# Patient Record
Sex: FEMALE | Race: WHITE
[De-identification: names, ages, dates, MRNs, and addresses within clinical notes are randomized per-mention and may not be internally consistent; named-entity substitution may affect disease eponyms.]

---

## 2017-02-17 ENCOUNTER — HOSPITAL ENCOUNTER (OUTPATIENT)
Dept: HOSPITAL 80 - FIMAGING | Age: 82
End: 2017-02-17
Attending: SURGERY
Payer: COMMERCIAL

## 2017-02-17 DIAGNOSIS — I65.23: Primary | ICD-10-CM

## 2017-02-17 DIAGNOSIS — I10: ICD-10-CM

## 2017-06-27 ENCOUNTER — HOSPITAL ENCOUNTER (OUTPATIENT)
Dept: HOSPITAL 80 - BMCIMAGING | Age: 82
End: 2017-06-27
Attending: FAMILY MEDICINE
Payer: COMMERCIAL

## 2017-06-27 DIAGNOSIS — J18.1: Primary | ICD-10-CM

## 2017-07-27 ENCOUNTER — HOSPITAL ENCOUNTER (OUTPATIENT)
Dept: HOSPITAL 80 - BHFA | Age: 82
End: 2017-07-27
Attending: INTERNAL MEDICINE
Payer: COMMERCIAL

## 2017-07-27 DIAGNOSIS — G45.8: ICD-10-CM

## 2017-07-27 DIAGNOSIS — I10: Primary | ICD-10-CM

## 2017-08-23 ENCOUNTER — HOSPITAL ENCOUNTER (OUTPATIENT)
Dept: HOSPITAL 80 - FIMAGING | Age: 82
End: 2017-08-23
Attending: INTERNAL MEDICINE
Payer: COMMERCIAL

## 2017-08-23 DIAGNOSIS — G45.8: Primary | ICD-10-CM

## 2017-08-23 PROCEDURE — 70498 CT ANGIOGRAPHY NECK: CPT

## 2017-09-15 ENCOUNTER — HOSPITAL ENCOUNTER (OUTPATIENT)
Dept: HOSPITAL 80 - FIMAGING | Age: 82
End: 2017-09-15
Attending: PSYCHIATRY & NEUROLOGY
Payer: COMMERCIAL

## 2017-09-15 DIAGNOSIS — I65.02: Primary | ICD-10-CM

## 2017-12-01 ENCOUNTER — HOSPITAL ENCOUNTER (OUTPATIENT)
Dept: HOSPITAL 80 - BHFA | Age: 82
End: 2017-12-01
Attending: INTERNAL MEDICINE
Payer: COMMERCIAL

## 2017-12-01 DIAGNOSIS — I25.10: Primary | ICD-10-CM

## 2017-12-01 DIAGNOSIS — I77.9: ICD-10-CM

## 2017-12-01 DIAGNOSIS — I63.9: Primary | ICD-10-CM

## 2017-12-12 ENCOUNTER — HOSPITAL ENCOUNTER (OUTPATIENT)
Dept: HOSPITAL 80 - BMCIMAGING | Age: 82
End: 2017-12-12
Attending: INTERNAL MEDICINE
Payer: COMMERCIAL

## 2017-12-12 DIAGNOSIS — R31.9: ICD-10-CM

## 2017-12-12 DIAGNOSIS — K59.00: Primary | ICD-10-CM

## 2017-12-12 PROCEDURE — 74020: CPT

## 2018-04-07 ENCOUNTER — HOSPITAL ENCOUNTER (EMERGENCY)
Dept: HOSPITAL 80 - FED | Age: 83
Discharge: HOME | End: 2018-04-07
Payer: COMMERCIAL

## 2018-04-07 VITALS — DIASTOLIC BLOOD PRESSURE: 68 MMHG | SYSTOLIC BLOOD PRESSURE: 148 MMHG

## 2018-04-07 DIAGNOSIS — S60.512A: ICD-10-CM

## 2018-04-07 DIAGNOSIS — S20.219A: ICD-10-CM

## 2018-04-07 DIAGNOSIS — S60.511A: ICD-10-CM

## 2018-04-07 DIAGNOSIS — E03.9: ICD-10-CM

## 2018-04-07 DIAGNOSIS — I10: ICD-10-CM
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
## 2018-04-07 DIAGNOSIS — S01.81XA: Primary | ICD-10-CM

## 2018-04-07 DIAGNOSIS — S90.31XA: ICD-10-CM

## 2018-04-07 PROCEDURE — 0HQ1XZZ REPAIR FACE SKIN, EXTERNAL APPROACH: ICD-10-PCS

## 2018-04-07 NOTE — EDPHY
H & P


Stated Complaint: pt tripped/fell, lacs to both hands and chin, pain in RUE/R 

chest





- Personal History


Tetanus Vaccine Date: < than 10 yrs





- Medical/Surgical History


Hx Asthma: No


Hx Chronic Respiratory Disease: No


Hx Diabetes: No


Hx Cardiac Disease: Yes


Hx Renal Disease: No


Hx Cirrhosis: No


Hx Alcoholism: No


Hx HIV/AIDS: No


Hx Splenectomy or Spleen Trauma: No


Other PMH: HX: HTN, HYPOTHYROID, osteoporosis, tonsillectomy, appendectomy, hip 

surg, L knee replacement, varicose vein surg





- Social History


Smoking Status: Never smoked


Time Seen by Provider: 04/07/18 19:49


HPI/ROS: 





CHIEF COMPLAINT:  Chin laceration, hand and foot pain





HISTORY OF PRESENT ILLNESS:  This is an 84-year-old female in general good 

health who presents after suffering a mechanical fall in which she stumbled, 

fell forward striking her chin on cement.  She did not strike her skull or lose 

consciousness.  She landed on her outstretched hands and then her chest.  She 

has some pain at the site of skin tears on her palms and reports chest pain in 

the midsternal region.  She was not experiencing chest pain prior to the fall 

and denies any preceding symptoms such lightheadedness or headache.  She does 

not currently feel short of breath.  She also reports some pain in the 1st and 

2nd toes on the right foot.  She is not aware of any numbness or weakness of 

arms or legs.





REVIEW OF SYSTEMS:





A ten point review of systems was performed and is negative with the exception 

of the items mentioned in the HPI.





Past medical history:


Hypertension


Osteoporosis


Varicose veins


Hypothyroid





Past surgical history:


Tonsillectomy


Appendectomy


Hip surgery


Knee surgery





Family history:





Social history:  She is .  She raised 5 children.  No tobacco use.





General:  The patient is in no acute distress.  The patient is alert.  Beny 

Coma Score is 15 .


Head:  Normocephalic/atraumatic.  No Avila's sign.  No raccoon eyes.


Neck:  Nontender with palpation of the cervical spine.  Trachea is midline.  

Nexus criteria are negative (no midline tenderness or distracting injury, 

mental status is not altered, no focal neurologic deficits).


Eyes:  PERRLA.  EOMI.  No subconjunctival hemorrhage.


Ears nose and throat:  No hemotympanum.  Nares are patent and without clotted 

nasal blood.  No dental injury or malocclusion.  No trismus.  Airway is patent.

  There is a 2.5 cm laceration underneath her chin.  No palpable facial bone 

tenderness or deformity.


Lungs:  No rib tenderness, crepitus, or subcutaneous emphysema.  She has 

tenderness over the sternum, no crepitance.  Breath sounds are equal and 

audible bilaterally.  No wheezes, rales, or rhonchi.


Cardiac:  Heart has regular rate and rhythm without murmur, rub, or gallop.


Abdomen:  Soft, nontender, and nondistended.  No guarding or rebound.  Bowel 

sounds are present.


Back:  No vertebral tenderness.


Skin:  No ecchymoses.  Skin is warm and dry.  Scattered skin tears over the 

palms of both hands.  No tenderness palpation of the carpals, metacarpals, and 

phalanges.  Full active range of motion of both hands.


Pulses:  2+ radial pulses bilaterally.  2+ dorsalis pedis pulses bilaterally.


Extremities:  No bony point tenderness with evaluation of long bones of all 4 

extremities.  Pelvis is stable.  Hips are nontender.  She has tenderness 

palpation of the right great and 2nd toe.  No palpable bony deformity.  No 

right ankle tenderness or pain with active range of motion.


Pulses:  2+ femoral and dorsalis pedis pulses bilaterally.


Neuro:  The patient is alert and oriented.  Sensation is intact to light touch 

of all 4 extremities.  Strength is 5/5 with testing of major motor groups in 

upper and lower extremities.  PERRLA.  EOMI.  Facial expression symmetric.  

Hearing intact to spoken voice. (Diana Mendez)


Constitutional: 


 Initial Vital Signs











Temperature (C)  36.6 C   04/07/18 19:24


 


Heart Rate  67   04/07/18 19:24


 


Respiratory Rate  18   04/07/18 19:24


 


Blood Pressure  157/69 H  04/07/18 19:24


 


O2 Sat (%)  92   04/07/18 19:24








 











O2 Delivery Mode               Room Air














Allergies/Adverse Reactions: 


 





No Known Allergies Allergy (Verified 04/07/18 19:28)


 








Home Medications: 














 Medication  Instructions  Recorded


 


Levothyroxine [Synthroid 150 mcg 0.125 mcg PO DAILY@1000 06/01/11





(*)]  


 


Lisinopril [Zestril 10 mg (*)] 10 mg PO DAILY 06/01/11


 


Metoprolol Tartrate  06/25/14


 


Losartan Potassium  04/07/18


 


Unk Blood Thinner  04/07/18


 


Unk Osteoporosis Med  04/07/18














Medical Decision Making





- Diagnostics


Imaging: I viewed and interpreted images myself


Procedures: 





My involvement the care this patient is solely for procedure.  Please see the 

note of the attending physician for all other aspects of care.  She has chin 

laceration status post fall that does require suture repair.





PROCEDURE: Laceration repair


Consent:  Verbal


Location:  Chin


Length of repair:  2.5 cm


Complexity:  Simple


Layer involvement:  Single


Anesthesia:  Local per 1% lidocaine plain.  5 mL


Irrigation:  Extensive


Debridement:  None


Procedure description:  Following good anesthesia, the wound was copiously 

irrigated.  Wound bed was explored with a sterile glove, and there is no 

foreign body noted.  Wound borders were approximated well with good hemostasis.

  Tolerated well without complication.


Suture/Staple material:  6-0 Prolene, 3 simple interrupted sutures


Wound care:  Routine as discussed


Suture/Staple removal: 5-7  Days (Bello Ballard)


ED Course/Re-evaluation: 





Laceration was repaired by MARILEE Palacios.





I reviewed her chest x-ray.  I do not see evidence of a sternal or rib 

fracture.  No pneumothorax.  I do not see fracture dislocation on the x-rays of 

the right foot.





Hand skin tears cleaned and dressed by ED technician.





Patient serially examined.  She remained neurologically intact, with no new 

injuries identified. We discussed CT of head.  She did not strike her head, has 

no headache, no confusion, no visual changes, no new numbness or weakness.  She 

would like to forego head scanning. (Diana Mendez)





- Data Points


Medications Given: 


 








Discontinued Medications





Acetaminophen (Tylenol)  650 mg PO EDNOW ONE


   Stop: 04/07/18 20:18


   Last Admin: 04/07/18 20:21 Dose:  650 mg


Tetracaine/Epinephrine/Lidocaine (Let Gel Topical)  1 ea TP EDNOW ONE


   Stop: 04/07/18 20:20


   Last Admin: 04/07/18 20:20 Dose:  1 ea








Departure





- Departure


Disposition: Home, Routine, Self-Care


Clinical Impression: 


 Laceration of chin, Abrasion of hand and fingers, Foot contusion, Contusion of 

chest wall with intact skin





Condition: Good


Instructions:  Contusion in Adults (ED), Abrasion (ED), R.I.C.E. Treatment (ED)

, Facial Laceration (ED)


Additional Instructions: 


Adult Pain & Fever Control:


We recommend Acetaminophen (Tylenol) and Ibuprofen (Motrin,Advil) for pain and 

fever control.  When fever is high or pain severe, both drugs can be used at 

the same time, but at different intervals.  Please note the time differences.  


Your dose is:     Acetaminophen 650mg every 4 to 6 hours


        Ibuprofen 400mg every 8 hours with food   OR


       


Note:  do not take Acetaminophen with Hydrocodone (Vicodin, Lortab) or Oycodone 

(Percocet).  These medications also contain Acetaminophen.  No more than 3000mg 

of Acetaminophen should be taken in 24 hours (for an adult).





If you develop new or concerning symptoms such as headache, confusion, change 

in vision, vomiting, seizures, weakness of an arm or leg, new numbness--you 

should call 911 and be re-evaluated immediately.  As we discussed, you take a 

blood thinner and this can increase your chances of having a more serious head 

injury, with bleeding.  I do not find any evidence of that kind of injury right 

now but I want you to be alerted to the danger signs that should prompt you to 

return immediately.





The stitches should be removed in 7 days.  Keep them clean and dry for 24 

hours.  After that, you can gently wash you chin and dab it dry. You can cover 

the stitches with a regular bandaid if you'd like.


Referrals: 


Obey Huff MD [Primary Care Provider] - As per Instructions

## 2018-04-07 NOTE — EDPHY
General


Time Seen by Provider: 04/07/18 19:49


Narrative: 





CHIEF COMPLAINT: 








HISTORY OF PRESENT ILLNESS: 








REVIEW OF SYSTEMS:


Ten systems reviewed and are negative unless otherwise noted in the HPI





PCP:








SPECIALISTS:








PAST MEDICAL HISTORY: 








PAST SURGICAL HISTORY:








SOCIAL HISTORY:








FAMILY HISTORY:








EXAMINATION


General Appearance:  Alert, no distress


Head: normocephalic, atraumatic


Eyes:  Pupils equal and round, no conjunctival pallor or injection


ENT, Mouth:  Mucous membranes moist


Neck:  Normal inspection, supple, non-tender


Respiratory:  Lungs are clear to auscultation


Cardiovascular:  Regular rate and rhythm


Gastrointestinal:  Abdomen is soft and nontender


Back: non-tender, no bony abnormalities


Neurological:  A&O, nonfocal, normal gait


Skin:  Warm and dry, no rash


Extremities:  Nontender, no pedal edema


Psychiatric:  Mood and affect normal





DIFFERENTIAL DIAGNOSES:


Including but not limited to 








MDM:











SUPERVISION:











- History


Smoking Status: Never smoked





- Objective


Vital Signs: 





 Initial Vital Signs











Temperature (C)  97.9 F   04/07/18 19:24


 


Heart Rate  67   04/07/18 19:24


 


Respiratory Rate  18   04/07/18 19:24


 


Blood Pressure  157/69 H  04/07/18 19:24


 


O2 Sat (%)  92   04/07/18 19:24








 











O2 Delivery Mode               Room Air














Allergies/Adverse Reactions: 


 





No Known Allergies Allergy (Verified 04/07/18 19:28)


 








Home Medications: 














 Medication  Instructions  Recorded


 


Levothyroxine [Synthroid 150 mcg 0.125 mcg PO DAILY@1000 06/01/11





(*)]  


 


Lisinopril [Zestril 10 mg (*)] 10 mg PO DAILY 06/01/11


 


Metoprolol Tartrate  06/25/14


 


Losartan Potassium  04/07/18


 


Unk Blood Thinner  04/07/18


 


Unk Osteoporosis Med  04/07/18














Departure





- Departure


Referrals: 


Obey Huff MD [Primary Care Provider] - As per Instructions

## 2018-04-09 ENCOUNTER — HOSPITAL ENCOUNTER (EMERGENCY)
Dept: HOSPITAL 80 - FED | Age: 83
Discharge: HOME | End: 2018-04-09
Payer: COMMERCIAL

## 2018-04-09 VITALS — SYSTOLIC BLOOD PRESSURE: 136 MMHG | DIASTOLIC BLOOD PRESSURE: 71 MMHG

## 2018-04-09 DIAGNOSIS — S10.93XD: ICD-10-CM

## 2018-04-09 DIAGNOSIS — I10: ICD-10-CM

## 2018-04-09 DIAGNOSIS — S01.81XD: Primary | ICD-10-CM

## 2018-04-09 DIAGNOSIS — I25.10: ICD-10-CM
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
## 2018-04-09 NOTE — EDPHY
H & P


Stated Complaint: Seen, D/C'd two days ago with sutures in chin.  Now bruising 

and concerned


Time Seen by Provider: 04/09/18 21:37


HPI/ROS: 





CHIEF COMPLAINT:  Contusion





HISTORY OF PRESENT ILLNESS:  The patient is an 84-year-old female who fell 

Saturday and has a laceration to her chin.  It has been repaired with sutures.  

Today she noticed some increased bruising traveling down her neck.  She does 

take Plavix for history of carotid artery disease.  She was concerned about 

infection we decided to come here to get checked out.  She has not had a fever.

  No discharge.








REVIEW OF SYSTEMS:


Constitutional:  denies: chills, fever, recent illness, recent injury


EENTM: denies: blurred vision, double vision, nose congestion


Respiratory: denies: cough, shortness of breath


Cardiac: denies: chest pain, irregular heart rate, lightheadedness, palpitations


Gastrointestinal/Abdominal: denies: abdominal pain, diarrhea, nausea, vomiting, 

blood streaked stools


Genitourinary: denies: dysuria, frequency, hematuria, pain


Musculoskeletal: denies: joint pain, muscle pain


Skin:  see HPI


Neurological: denies: headache, numbness, paresthesia, tingling, dizziness, 

weakness


Hematologic/Lymphatic: denies: blood clots, easy bleeding, easy bruising


Immunologic/allergic: denies: HIV/AIDS, transplant








EXAM:


GENERAL:  Well-appearing, well-nourished and in no acute distress.


HEAD:  Atraumatic, normocephalic.


EYES:  Pupils equal round and reactive to light, extraocular movements intact, 

sclera anicteric, conjunctiva are normal.


ENT:  TMs normal, nares patent, oropharynx clear without exudates.  Moist 

mucous membranes.


NECK:  Normal range of motion, supple without lymphadenopathy or JVD.


LUNGS:  Breath sounds clear to auscultation bilaterally and equal.  No wheezes 

rales or rhonchi.


HEART:  Regular rate and rhythm without murmurs, rubs or gallops.


ABDOMEN:  Soft, nontender, normoactive bowel sounds.  No guarding, no rebound.  

No masses appreciated.


BACK:  No CVA tenderness, no spinal tenderness, step-offs or deformities


EXTREMITIES:  Normal range of motion, no pitting or edema.  No clubbing or 

cyanosis.


NEUROLOGICAL:  Cranial nerves II through XII grossly intact.  Normal speech, 

normal gait.  5/5 strength, normal movement in all extremities, normal sensation


PSYCH:  Normal mood, normal affect.


SKIN:  Well-healing laceration, Small amount of bruising around the laceration.

  Some gravitation of the bruise.








Source: Patient


Exam Limitations: No limitations





- Personal History


Current Tetanus/Diphtheria Vaccine: Yes


Current Tetanus Diphtheria and Acellular Pertussis (TDAP): Yes


Tetanus Vaccine Date: < than 10 yrs





- Medical/Surgical History


Hx Asthma: No


Hx Chronic Respiratory Disease: No


Hx Diabetes: No


Hx Cardiac Disease: Yes


Hx Renal Disease: No


Hx Cirrhosis: No


Hx Alcoholism: No


Hx HIV/AIDS: No


Hx Splenectomy or Spleen Trauma: No


Other PMH: HX: HTN, HYPOTHYROID, osteoporosis, tonsillectomy, appendectomy, hip 

surg, L knee replacement, varicose vein surg.





- Family History


Significant Family History: No pertinent family hx





- Social History


Smoking Status: Never smoked


Alcohol Use: Sober


Drug Use: None


Constitutional: 


 Initial Vital Signs











Temperature (C)  36.5 C   04/09/18 21:20


 


Heart Rate  60   04/09/18 21:20


 


Respiratory Rate  17   04/09/18 21:20


 


Blood Pressure  136/71 H  04/09/18 21:20


 


O2 Sat (%)  95   04/09/18 21:20








 











O2 Delivery Mode               Room Air














Allergies/Adverse Reactions: 


 





No Known Allergies Allergy (Verified 04/07/18 19:28)


 








Home Medications: 














 Medication  Instructions  Recorded


 


Levothyroxine [Synthroid 150 mcg 0.125 mcg PO DAILY@1000 06/01/11





(*)]  


 


Lisinopril [Zestril 10 mg (*)] 10 mg PO DAILY 06/01/11


 


Metoprolol Tartrate  06/25/14


 


Losartan Potassium  04/07/18


 


Unk Blood Thinner  04/07/18


 


Unk Osteoporosis Med  04/07/18














Medical Decision Making


ED Course/Re-evaluation: 





We discussed bruising that is to be expected considering she is on Plavix.  I 

suggested ice.  We discussed suture care and removal.  She is happy with this 

and declines further workup or testing at this time.


Differential Diagnosis: 





Partial list of the Differential diagnosis considered include but were not 

limited to;  contusion, infection and although unlikely based on the history 

and physical exam, I also considered foreign body, dehiscence, fracture, skull 

fracture.  I discussed these differential diagnoses and the plan with the 

patient as well as the usual and expected course.  The patient understands that 

the diagnosis is provisional and that in medicine we are not always correct and 

that further workup is often warranted.  Usual and customary warnings were 

given.  All of the patient's questions were answered.  The patient was 

instructed to return to the emergency department should the symptoms at all 

worsen or return, otherwise to followup with the physician as we discussed.





Departure





- Departure


Disposition: Home, Routine, Self-Care


Clinical Impression: 


 Laceration





Contusion


Qualifiers:


 Encounter type: initial encounter Contusion area: neck Qualified Code(s): 

S10.93XA - Contusion of unspecified part of neck, initial encounter





Condition: Fair


Instructions:  Contusion in Adults (ED)


Referrals: 


Obey Huff MD [Primary Care Provider] - As per Instructions

## 2018-11-10 ENCOUNTER — HOSPITAL ENCOUNTER (OUTPATIENT)
Dept: HOSPITAL 80 - BMCIMAGING | Age: 83
End: 2018-11-10
Attending: FAMILY MEDICINE
Payer: COMMERCIAL

## 2018-11-10 DIAGNOSIS — S82.61XA: Primary | ICD-10-CM

## 2018-12-28 ENCOUNTER — HOSPITAL ENCOUNTER (OUTPATIENT)
Dept: HOSPITAL 80 - BMCIMAGING | Age: 83
End: 2018-12-28
Attending: EMERGENCY MEDICINE
Payer: COMMERCIAL

## 2018-12-28 DIAGNOSIS — S82.831D: ICD-10-CM

## 2018-12-28 DIAGNOSIS — M25.861: Primary | ICD-10-CM

## 2019-02-20 ENCOUNTER — HOSPITAL ENCOUNTER (OUTPATIENT)
Dept: HOSPITAL 80 - FIMAGING | Age: 84
End: 2019-02-20
Attending: RADIOLOGY
Payer: COMMERCIAL

## 2019-02-20 DIAGNOSIS — I83.93: Primary | ICD-10-CM
